# Patient Record
Sex: FEMALE | Race: WHITE | NOT HISPANIC OR LATINO | Employment: FULL TIME | URBAN - METROPOLITAN AREA
[De-identification: names, ages, dates, MRNs, and addresses within clinical notes are randomized per-mention and may not be internally consistent; named-entity substitution may affect disease eponyms.]

---

## 2024-09-23 ENCOUNTER — HOSPITAL ENCOUNTER (EMERGENCY)
Facility: HOSPITAL | Age: 39
Discharge: LEFT BEFORE DISCHARGE | End: 2024-09-23
Payer: COMMERCIAL

## 2024-09-23 VITALS
WEIGHT: 144.62 LBS | DIASTOLIC BLOOD PRESSURE: 74 MMHG | HEART RATE: 65 BPM | HEIGHT: 64 IN | TEMPERATURE: 97.9 F | BODY MASS INDEX: 24.69 KG/M2 | RESPIRATION RATE: 18 BRPM | SYSTOLIC BLOOD PRESSURE: 113 MMHG | OXYGEN SATURATION: 100 %

## 2024-09-23 DIAGNOSIS — R10.84 ABDOMINAL PAIN, GENERALIZED: Primary | ICD-10-CM

## 2024-09-23 DIAGNOSIS — Z53.29 LEFT AGAINST MEDICAL ADVICE: ICD-10-CM

## 2024-09-23 PROCEDURE — 99281 EMR DPT VST MAYX REQ PHY/QHP: CPT

## 2024-09-23 RX ORDER — FAMOTIDINE 10 MG/ML
20 INJECTION INTRAVENOUS ONCE
Status: DISCONTINUED | OUTPATIENT
Start: 2024-09-23 | End: 2024-09-23 | Stop reason: HOSPADM

## 2024-09-23 ASSESSMENT — PAIN DESCRIPTION - ONSET: ONSET: SUDDEN

## 2024-09-23 ASSESSMENT — PAIN DESCRIPTION - PROGRESSION: CLINICAL_PROGRESSION: NOT CHANGED

## 2024-09-23 ASSESSMENT — COLUMBIA-SUICIDE SEVERITY RATING SCALE - C-SSRS
6. HAVE YOU EVER DONE ANYTHING, STARTED TO DO ANYTHING, OR PREPARED TO DO ANYTHING TO END YOUR LIFE?: NO
2. HAVE YOU ACTUALLY HAD ANY THOUGHTS OF KILLING YOURSELF?: NO
1. IN THE PAST MONTH, HAVE YOU WISHED YOU WERE DEAD OR WISHED YOU COULD GO TO SLEEP AND NOT WAKE UP?: NO

## 2024-09-23 ASSESSMENT — PAIN SCALES - GENERAL: PAINLEVEL_OUTOF10: 6

## 2024-09-23 ASSESSMENT — PAIN DESCRIPTION - FREQUENCY: FREQUENCY: CONSTANT/CONTINUOUS

## 2024-09-23 ASSESSMENT — PAIN DESCRIPTION - ORIENTATION: ORIENTATION: LEFT;RIGHT;UPPER

## 2024-09-23 ASSESSMENT — PAIN - FUNCTIONAL ASSESSMENT: PAIN_FUNCTIONAL_ASSESSMENT: 0-10

## 2024-09-23 ASSESSMENT — PAIN DESCRIPTION - DESCRIPTORS
DESCRIPTORS: SHARP
DESCRIPTORS: SHARP

## 2024-09-23 ASSESSMENT — PAIN DESCRIPTION - PAIN TYPE: TYPE: ACUTE PAIN

## 2024-09-23 ASSESSMENT — PAIN DESCRIPTION - LOCATION: LOCATION: ABDOMEN

## 2024-09-23 NOTE — ED PROVIDER NOTES
HPI   Chief Complaint   Patient presents with    Abdominal Pain     On Sunday morning at 0600 I started having a sharp pain in my upper lt and rt abd it feels like something is inside of me the pain increases when I stand up I took to laxatives and ibuprofen I have had normal bms no blood        HPI  39-year-old female here for evaluation of abdominal pain, started this past Sunday, is mostly in the upper mid abdomen.  Says that it feels sharp in nature.  She thought initially might be constipation took some laxatives but did not seem to have a lot of improvement.  The patient denies any bloody diarrhea, no nausea or vomiting.  But says that she does have a history of ruptured ectopic in past for which she just is always concerned about new types of pains.  Does not seem to have any change to symptoms with food intake.      Patient History   Past Medical History:   Diagnosis Date    Other conditions influencing health status     ASCUS with positive high risk HPV    Personal history of other infectious and parasitic diseases     History of varicella     Past Surgical History:   Procedure Laterality Date    OTHER SURGICAL HISTORY  06/25/2014    Colposcopy Cervix With Biopsy(S)     No family history on file.  Social History     Tobacco Use    Smoking status: Never    Smokeless tobacco: Never   Substance Use Topics    Alcohol use: Not on file    Drug use: Never       Physical Exam   ED Triage Vitals [09/23/24 1417]   Temperature Heart Rate Respirations BP   36.6 °C (97.9 °F) 65 18 113/74      Pulse Ox Temp Source Heart Rate Source Patient Position   100 % Oral Monitor Sitting      BP Location FiO2 (%)     Left arm --       Physical Exam  PHYSICAL EXAMINATION    GENERAL APPEARANCE: Awake and alert.     VITAL SIGNS: As per the nurses' triage record.     HEENT: Normocephalic, atraumatic. Extraocular muscles are intact. Pupils equal round and reactive to light. Conjunctiva are pink. Negative scleral icterus. Mucous  membranes are moist. Tongue in the midline. Pharynx was without erythema or exudates, uvula midline    NECK: Soft Nontender and supple, full gross ROM, no meningeal signs.    CHEST: Nontender to palpation. Clear to auscultation bilaterally. No rales, rhonchi, or wheezing.     HEART: S1, S2. Regular rate and rhythm. No murmurs, gallops or rubs.  Strong and equal pulses in the extremities.     ABDOMEN: Soft, subjective discomfort upper abdomen.  No guarding or rebound.  Mildly reproducible in the epigastric region.    MUSCULOSKELETAL: The calves are nontender to palpation. Full gross active range of motion. Ambulating on own with no acute difficulties     NEUROLOGICAL: Awake, alert and oriented x 3. Power intact in the upper and lower extremities. Sensation is intact to light touch in the upper and lower extremities.     IMMUNOLOGICAL: No lymphatic streaking noted     DERM: No petechiae, rashes, or ecchymoses.    ED Course & MDM   ED Course as of 09/23/24 1549   Mon Sep 23, 2024   1546 Patient tells me that she is significantly against needles.  She states that she does not want any lab work or any imaging studies if it involves needles.  I had nearly a 20-minute conversation with her about the risks and benefits and the testing that would be performed for abdominal pain including laboratory studies followed by either ultrasound or CT for continued abdominal pain.  She has indicated that the pain in her opinion is not as bad as she initially anticipated and at this time would like to be discharged.  The patient has indicated an understanding that this is AGAINST MEDICAL ADVICE, she states that she will come back if necessary or if the symptoms do not improve but at this time would like to be discharged without any test or diagnostics. [AP]      ED Course User Index  [AP] Forest Ruiz PA-C         Diagnoses as of 09/23/24 1549   Abdominal pain, generalized   Left against medical advice                 No data  recorded     [place] Coma Scale Score: 15 (09/23/24 1412 : Marvin Thomas, EMT)                           Medical Decision Making  Parts of this chart have been completed using voice recognition software. Please excuse any errors of transcription.  My thought process and reason for plan has been formulated from the time that I saw the patient until the time of disposition and is not specific to one specific moment during their visit and furthermore my MDM encompasses this entire chart and not only this text box.      HPI: Detailed above.    Exam: A medically appropriate exam performed, outlined above, given the known history and presentation.    History Limited by: Nothing    History obtained from: The patient    External/internal records reviewed: No external records reviewed    Social Determinants of Health considered during this visit: Lives at home    Chronic conditions impacting care: Denies    Medications given during visit:  Medications   sodium chloride 0.9 % bolus 1,000 mL (has no administration in time range)   famotidine PF (Pepcid) injection 20 mg (has no administration in time range)        Diagnostic/tests  Labs Reviewed   CBC WITH AUTO DIFFERENTIAL   COMPREHENSIVE METABOLIC PANEL   URINALYSIS WITH REFLEX CULTURE AND MICROSCOPIC    Narrative:     The following orders were created for panel order Urinalysis with Reflex Culture and Microscopic.  Procedure                               Abnormality         Status                     ---------                               -----------         ------                     Urinalysis with Reflex C...[[id number]]                                                 Extra Urine Gray Tube[[id number]]                                                         Please view results for these tests on the individual orders.   HCG, URINE, QUALITATIVE   LIPASE   MAGNESIUM   URINALYSIS WITH REFLEX CULTURE AND MICROSCOPIC   EXTRA URINE GRAY TUBE      No orders to display         Diagnostic tests considered but not performed: Labs and CT ordered considered however patient refused    Case discussed with: The patient      Considerations/further MDM:  We discussed the nature and purpose, risks and benefits, as well as, the alternatives of the given diagnosis and concerns. Time was given to allow the opportunity to ask questions and consider their options, and after the discussion, the patient decided to refuse the offered treatment plan. The patient was informed that refusal could lead to, but was not limited to, death, permanent disability, or severe pain, loss of current lifestyles and abilities. If present, I asked the relatives or significant others to dissuade them without success. Prior to refusing, their nurse and I determined and agreed that the patient had the capacity to make their decision and understood the consequences of that decision. They signed the refusal of treatment form and their nurse signed the form agreeing that the patient/guardian had received informed consent. After refusal, I made every reasonable opportunity to treat them to the best of my ability while still trying to accommodate the patient's wishes and desires.    The patient has agreed to sign out AGAINST MEDICAL ADVICE did not want any labs, had extensive discussion with her regarding specifically a CAT scan based on the upper abdominal pain, discussed my concerns of possible gallbladder, peptic ulcer disease, acid reflux.  The patient has verbalized an understanding of concern, states that she does not want any test at this time and will sign out AGAINST MEDICAL ADVICE and return if new different or worsening pains or symptoms present.      Procedure  Procedures     Forest Ruiz PA-C  09/23/24 1452

## 2024-09-23 NOTE — DISCHARGE INSTRUCTIONS
You have elected to leave the emergency department without testing.  It has been recommended based on the complaint that additional tests including laboratory studies and possibly imaging of the abdomen be performed.  However you have changed her mind and would like to go home.  You are always welcome back in the emergency department anytime.  If you change your mind or develop new different worsening pains or symptoms I strongly encourage you call 911 or return to the emergency department.